# Patient Record
Sex: MALE | Race: WHITE | HISPANIC OR LATINO | Employment: UNEMPLOYED | URBAN - METROPOLITAN AREA
[De-identification: names, ages, dates, MRNs, and addresses within clinical notes are randomized per-mention and may not be internally consistent; named-entity substitution may affect disease eponyms.]

---

## 2017-11-29 ENCOUNTER — APPOINTMENT (OUTPATIENT)
Dept: RADIOLOGY | Facility: MEDICAL CENTER | Age: 32
End: 2017-11-29
Attending: EMERGENCY MEDICINE

## 2017-11-29 ENCOUNTER — HOSPITAL ENCOUNTER (EMERGENCY)
Facility: MEDICAL CENTER | Age: 32
End: 2017-11-29
Attending: EMERGENCY MEDICINE

## 2017-11-29 VITALS
TEMPERATURE: 97.8 F | HEART RATE: 60 BPM | DIASTOLIC BLOOD PRESSURE: 78 MMHG | BODY MASS INDEX: 27.18 KG/M2 | OXYGEN SATURATION: 98 % | RESPIRATION RATE: 18 BRPM | WEIGHT: 169.09 LBS | HEIGHT: 66 IN | SYSTOLIC BLOOD PRESSURE: 124 MMHG

## 2017-11-29 DIAGNOSIS — S02.81XA CLOSED FRACTURE OF OTHER BONE OF RIGHT SIDE OF FACE, INITIAL ENCOUNTER (HCC): ICD-10-CM

## 2017-11-29 DIAGNOSIS — S00.83XA CONTUSION OF FACE, INITIAL ENCOUNTER: ICD-10-CM

## 2017-11-29 DIAGNOSIS — S01.81XA FACIAL LACERATION, INITIAL ENCOUNTER: ICD-10-CM
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
## 2017-11-29 DIAGNOSIS — S02.85XA CLOSED FRACTURE OF ORBIT, INITIAL ENCOUNTER (HCC): ICD-10-CM

## 2017-11-29 PROCEDURE — 71010 DX-CHEST-LIMITED (1 VIEW): CPT

## 2017-11-29 PROCEDURE — 96372 THER/PROPH/DIAG INJ SC/IM: CPT

## 2017-11-29 PROCEDURE — 304999 HCHG REPAIR-SIMPLE/INTERMED LEVEL 1

## 2017-11-29 PROCEDURE — 304217 HCHG IRRIGATION SYSTEM

## 2017-11-29 PROCEDURE — 72125 CT NECK SPINE W/O DYE: CPT

## 2017-11-29 PROCEDURE — 70486 CT MAXILLOFACIAL W/O DYE: CPT

## 2017-11-29 PROCEDURE — 303747 HCHG EXTRA SUTURE

## 2017-11-29 PROCEDURE — 700101 HCHG RX REV CODE 250: Performed by: EMERGENCY MEDICINE

## 2017-11-29 PROCEDURE — 700102 HCHG RX REV CODE 250 W/ 637 OVERRIDE(OP): Performed by: EMERGENCY MEDICINE

## 2017-11-29 PROCEDURE — 99284 EMERGENCY DEPT VISIT MOD MDM: CPT

## 2017-11-29 PROCEDURE — A9270 NON-COVERED ITEM OR SERVICE: HCPCS | Performed by: EMERGENCY MEDICINE

## 2017-11-29 PROCEDURE — 73130 X-RAY EXAM OF HAND: CPT | Mod: LT

## 2017-11-29 PROCEDURE — 700111 HCHG RX REV CODE 636 W/ 250 OVERRIDE (IP): Performed by: EMERGENCY MEDICINE

## 2017-11-29 PROCEDURE — 99285 EMERGENCY DEPT VISIT HI MDM: CPT

## 2017-11-29 PROCEDURE — 70450 CT HEAD/BRAIN W/O DYE: CPT

## 2017-11-29 RX ORDER — LIDOCAINE HYDROCHLORIDE 10 MG/ML
4 INJECTION, SOLUTION INFILTRATION; PERINEURAL ONCE
Status: COMPLETED | OUTPATIENT
Start: 2017-11-29 | End: 2017-11-29

## 2017-11-29 RX ORDER — HYDROCODONE BITARTRATE AND ACETAMINOPHEN 5; 325 MG/1; MG/1
1 TABLET ORAL ONCE
Status: COMPLETED | OUTPATIENT
Start: 2017-11-29 | End: 2017-11-29

## 2017-11-29 RX ORDER — HYDROCODONE BITARTRATE AND ACETAMINOPHEN 5; 325 MG/1; MG/1
1-2 TABLET ORAL EVERY 6 HOURS PRN
Qty: 10 TAB | Refills: 0 | Status: SHIPPED | OUTPATIENT
Start: 2017-11-29

## 2017-11-29 RX ORDER — MORPHINE SULFATE 4 MG/ML
4 INJECTION, SOLUTION INTRAMUSCULAR; INTRAVENOUS ONCE
Status: COMPLETED | OUTPATIENT
Start: 2017-11-29 | End: 2017-11-29

## 2017-11-29 RX ADMIN — LIDOCAINE HYDROCHLORIDE 4 ML: 10 INJECTION, SOLUTION INFILTRATION; PERINEURAL at 01:00

## 2017-11-29 RX ADMIN — MORPHINE SULFATE 4 MG: 4 INJECTION INTRAVENOUS at 00:50

## 2017-11-29 RX ADMIN — HYDROCODONE BITARTRATE AND ACETAMINOPHEN 1 TABLET: 5; 325 TABLET ORAL at 08:20

## 2017-11-29 RX ADMIN — HYDROCODONE BITARTRATE AND ACETAMINOPHEN 1 TABLET: 5; 325 TABLET ORAL at 03:00

## 2017-11-29 NOTE — ED NOTES
Assumed care of patient. ERP at bedside now to update pt on POC. Awaiting ride to [facility] for discharge.
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
## 2017-11-29 NOTE — ED NOTES
SW to bedside updated on POC. Pt given discharge instructions and prescription. Pt verbalized understanding. Pt taken with tech out to senior lounge to await placement. VSS.

## 2017-11-29 NOTE — DISCHARGE PLANNING
Medical social Work    Social work called Renae Stinson [phone] to discuss pt discharge plan. Renae reported waiting to hear from [place] [employer] [facility] (cleopatra) for more information.    Social work spoke with Cleopatra from [facility] [phone] regarding pt emergency housing. Cleopatra reported that she spoke with [facility] [phone]) who requested the pt to call them to do possible intake interview.    HILARY responded to bedside to aid pt in contacting safe [facility]. Pt spoke with Neyda, and was transferred to an advocate. [facility] advocate Danielle reported to pt that she would need to talk to [facility]ant[facility] and Cleopatra to request release of information for services. Danielle reported to pt that she would not be able to admit to housing with [facility].    HILARY provided numbers to Danielle for contact to continue working on discharge plane. HILARY requested that Cleopatra Call back with updates.    Note reviewed by MELINDA Muller

## 2017-11-29 NOTE — DISCHARGE PLANNING
Medical social work     SW was referred to Trice at [facility] for further plan for pt. Housing is currently unavailable pre Renae at [facility], and Danielle at [facility].    Sw to continue to follow
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
## 2017-11-29 NOTE — ED NOTES
Christa from [facility] called. Would like an update on patient. Called Christa at [phone]

## 2017-11-29 NOTE — DISCHARGE INSTRUCTIONS
Laceración facial  (Facial Laceration)  Catracho laceración facial es un ryan en el oscar. Estas lesiones pueden ser dolorosas y causan sangrado. Es posible que algunos vera deban cerrarse con puntos (suturas), tiras adhesivas para la piel o adhesivo para heridas. Normalmente los vera se curan rápidamente, baldomero pueden dejar catracho cicatriz. Puede demorar entre rosalia y dos años para que la cicatriz desaparezca completamente.  CUIDADOS EN EL HOGAR   · Solo tome los medicamentos que le haya indicado may médico.  · Siga las instrucciones de may médico para el cuidado de la herida.  En kate de que tenga puntos:  · Mantenga la herida limpia y seca.  · Si tiene catracho venda (vendaje) cámbiela al menos catracho vez al día. Cambie el vendaje si se moja o se ensucia, o según las indicaciones del médico.  · Lave el ryan dos veces por día con agua y jabón. Enjuáguelo con agua. Seque dando palmaditas con un paño limpio y seco.  · Aplique catracho capa delgada de crema con medicamento sobre el ryan, según las indicaciones del médico.  · Puede ducharse después de las primeras 24 horas. No moje la herida hasta que le hayan quitado los puntos.  · Concurra al médico cuando angel lo indique para que le retiren los puntos.  · No use maquillaje hasta unos días después de que le quiten los puntos.  En kate que tenga tiras adhesivas para la piel:  · Mantenga la herida limpia y seca.  · No permita que las tiras se mojen. Puede bañarse, baldomero tenga cuidado de no mojar el ryan.  · Si se moja, séquelo dando palmaditas con catracho toalla limpia.  · Las tiras caerán por sí mismas. No quite las tiras que aún están adheridas al ryan.  En kate de que le hayan aplicado adhesivo para heridas:  · Puede ducharse o peggy nikky de inmersión. No frote ni sumerja el ryan. No practique natación. Evite transpirar mucho hasta que el adhesivo desaparezca. Después de ducharse o darse un baño, seque el ryan dando palmaditas con catracho toalla limpia.  · No coloque medicamentos ni  maquillaje en el ryan hasta que el adhesivo se haya caído.  · Si tiene un vendaje, no pegue cinta adhesiva sobre el adhesivo.  · Evite la kaushik solar o las lámparas para bronceado hasta que el adhesivo se haya caído.  · El adhesivo caerá por sí solo en 5 a 10 días. No toque el adhesivo.  Después de la curación:  · Aplique pantalla solar sobre el ryan celestina el primer año, para reducir la cicatriz.  SOLICITE AYUDA SI:  · Tiene fiebre.  SOLICITE AYUDA DE INMEDIATO SI:   · La brenda del ryan está marie, le duele o está hinchada.  · Observa catracho secreción de color lara amarillento (pus) que sale del ryan.     Esta información no tiene patsy fin reemplazar el consejo del médico. Asegúrese de hacerle al médico cualquier pregunta que tenga.     Document Released: 08/15/2012 Document Revised: 01/08/2016  [employer] Interactive Patient Education ©2016 [employer]        Contusión  (Contusion)  Catracho contusión es un hematoma profundo. Las contusiones son el resultado de catracho lesión que causa sangrado debajo de la piel. La brenda de la contusión puede ponerse lnea, morada o amarilla. Las lesiones menores causarán contusiones sin dolor, baldomero las más graves pueden presentar dolor e inflamación celestina un par de semanas.   CAUSAS   Generalmente, catracho contusión se debe a un golpe, un traumatismo o catracho fuerza directa en catracho brenda del cuerpo.  SÍNTOMAS   · Hinchazón y enrojecimiento en la brenda de la lesión.  · Hematomas en la brenda de la lesión.  · Dolor con la palpación y sensibilidad en la brenda de la lesión.  · Dolor.  DIAGNÓSTICO   Se puede establecer el diagnóstico al hacer catracho historia clínica y un examen físico. Chris vez sea necesario hacer catracho radiografía, catracho tomografía computarizada o catracho resonancia magnética para determinar si hay lesiones asociadas, patsy fracturas.  TRATAMIENTO   El tratamiento específico dependerá de la brenda del cuerpo donde se produjo la lesión. En general, el mejor tratamiento para catracho contusión es el reposo,  la aplicación de hielo, la elevación de la brenda y la aplicación de compresas frías en la brenda de la lesión. Para calmar el dolor también podrán recomendarle medicamentos de venta simone. Pregúntele al médico cuál es el mejor tratamiento para may contusión.  INSTRUCCIONES PARA EL CUIDADO EN EL HOGAR   · Aplique hielo sobre la brenda lesionada.  ¨ Ponga el hielo en catracho bolsa plástica.  ¨ Colóquese catracho toalla entre la piel y la bolsa de hielo.  ¨ Deje el hielo celestina 15 a 20 minutos, 3 a 4 veces por día, o según las indicaciones del médico.  · Utilice los medicamentos de venta simone o recetados para calmar el dolor, el malestar o la fiebre, según se lo indique el médico. El médico podrá indicarle que evite peggy antiinflamatorios (aspirina, ibuprofeno y naproxeno) celestina 48 horas ya que estos medicamentos pueden aumentar los hematomas.  · Mantenga la brenda de la lesión en reposo.  · Si es posible, eleve la brenda de la lesión para reducir la hinchazón.  SOLICITE ATENCIÓN MÉDICA DE INMEDIATO SI:   · El hematoma o la hinchazón aumentan.  · Siente dolor que empeora.  · La hinchazón o el dolor no se alivian con los medicamentos.  ASEGÚRESE DE QUE:   · Comprende estas instrucciones.  · Controlará may afección.  · Recibirá ayuda de inmediato si no mejora o si empeora.     Esta información no tiene patsy fin reemplazar el consejo del médico. Asegúrese de hacerle al médico cualquier pregunta que tenga.     Document Released: 09/27/2006 Document Revised: 12/23/2014  [employer] Interactive Patient Education ©2016 Else[employer] Inc.        Fractura de suelo orbital, estallido  (Orbital Floor Fracture, Blowout)  El jazmine se apoya sobre catracho estructura ósea del cráneo llamada órbita. Las carmona superiores y exteriores de la órbita son muy gruesas y tessa. Las mismas protegen el jazmine si la valentin recibe un golpe desde arriba o desde un lado. Sin embargo, la pared interior cercana a la nariz y el suelo de la órbita son muy finas y débiles. El suelo  "óseo de la órbita también actúa patsy techo del espacio lleno de aire (sinus) debajo de la órbita.  Si el jazmine recibe un golpe directo desde el frente, todos los tejidos que rodean el jazmine se presionarán entre sí ligeramente. [place] aumenta la presión de la pared orbital. Debido a que las carmona más débiles suelen francisca lugar brittanie, la pared interna o el suelo de la órbita podría romperse. Si se fractura el suelo orbital, los tejidos que rodean el jazmine, incluido el músculo que se utiliza para hacer que el jazmine elena hacia abajo, podría quedar atrapado dentro de la fractura mientras que el suelo orbital \"explota\" hacia el sinus.   CAUSAS  Las fracturas de suelo orbital están causadas por traumatismos directos (contundentes) en la región del jazmine.  SÍNTOMAS  Si suponemos que no ha habido catracho lesión directa al jazmine, los síntomas pueden incluir:  · Hinchazón (inflamación) y dolor en el área del jazmine (\"jazmine adán\").  · Un liyah ahogado cuando se hace presión sobre el área. Madina liyah proviene del aire que se escapa desde el sinus hacia el espacio que rodea el jazmine (enfisema orbital).  · Visión doble  un objeto más alto que el otro (diplopía vertical). Es el resultado de que el músculo que mueve al jazmine está atrapado con la fractura. Debido a que no puede relajarse, el jazmine queda en catracho posición hacia abajo en relación con el otro y no puede mirar hacia arriba. La diplopía vertical por catracho fractura de suelo orbital es peor al mirar hacia arriba.  · Dolor alrededor del jazmine al mirar hacia arriba.  · Un jazmine se ve más hundido en comparación con el otro (enoftalmos).  · Adormecimiento de la mejilla y la parte superior de la encía del mismo lado de la aissatou en la que se encuentra la fractura. Es el resultado de catracho lesión del nervio en phyllis brenda. Madina nervio se encuentra en catracho hendidura a lo gregg del hueso del suelo orbital y va hacia las mejillas y las encías superiores.  DIAGNÓSTICO  El diagnóstico de catracho fractura de suelo orbital se " presume a partir de un examen realizado por un oftalmólogo. Se confirma mediante michel X o catracho tomografía computada del área del jazmine.  TRATAMIENTO  · Las fracturas del suelo orbital no suelen tratarse hasta que haya desaparecido la hinchazón. [place] puede durar entre 1 y 2 semanas. Catracho vez que la hinchazón ha desaparecido, el oftalmólogo verá si el músculo debajo del jazmine está aún atrapado dentro de la fractura.  · Si no hay señales de que el músculo esté atrapado o de que haya catracho diplopía vertical no será necesario el tratamiento.  · Si hay visión doble sólo cuando se kendal hacia arriba, se podrá decidir no hacer nada debido a que la mayoría de las personas no pasan mucho tiempo mirando hacia arriba. [place] podrá depender de la profesión de la persona. Por ejemplo, un plomero o electricista podría pasar gran parte del día mirando hacia arriba y por lo tanto necesitar tratamiento.  · Si la visión doble vertical persiste incluso cuando los ojos tamela hacia adelante, el oftalmólogo intentará liberar el músculo en el consultorio. Si fracasa, se necesitará cirugía.  SOLICITE ATENCIÓN MÉDICA DE INMEDIATO SI:  Si ha recibido un golpe en la brenda de los ojos y tiene:  · Pérdida de visión en cualquiera de los ojos.  · Hinchazón y dolor alrededor del jazmine.  · Un jazmine se ve más hundido en comparación con el otro.  · Ve doble con ambos ojos al mirar en cualquier dirección.  · Las dos imágenes se alejan al mirar en cierta dirección  especialmente hacia arriba.  · Tiene dolor en las mejillas y en las encías superiores del lado de la lesión.  · La temperatura oral se eleva sin motivo por encima de 38,9° C (102° F) o según le indique el profesional que lo asiste.  Document Released: 09/27/2006 Document Revised: 03/11/2013  [employer]® Patient Information ©2014 [employer].
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
## 2017-11-29 NOTE — DISCHARGE PLANNING
Medical Social Work    Ongoing: MSW spoke with Alisa with [facility] to receive an update.  Alisa states that they are trying to work with [facility] for emergency housing for pt but cannot reach them until 0900.  MSW updated AM HILARY Almaraz and bedside RN.    Plan: pending [facility] for a safe D/C plan.

## 2017-11-29 NOTE — ED NOTES
Pt medicated for pain. Ambulated up to bathroom with steady gait and changing into her clothes. Awaiting [facility] for d/c plan.

## 2017-12-28 ENCOUNTER — HOSPITAL ENCOUNTER (EMERGENCY)
Facility: MEDICAL CENTER | Age: 32
End: 2017-12-28
Payer: MEDICAID

## 2017-12-28 VITALS
RESPIRATION RATE: 16 BRPM | BODY MASS INDEX: 27.81 KG/M2 | HEART RATE: 98 BPM | HEIGHT: 66 IN | SYSTOLIC BLOOD PRESSURE: 111 MMHG | TEMPERATURE: 98 F | WEIGHT: 173.06 LBS | DIASTOLIC BLOOD PRESSURE: 74 MMHG | OXYGEN SATURATION: 95 %

## 2017-12-28 PROCEDURE — 302449 STATCHG TRIAGE ONLY (STATISTIC)
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
